# Patient Record
Sex: MALE | Race: OTHER
[De-identification: names, ages, dates, MRNs, and addresses within clinical notes are randomized per-mention and may not be internally consistent; named-entity substitution may affect disease eponyms.]

---

## 2017-05-27 NOTE — EDPHY
H & P


Time Seen by Provider: 05/27/17 15:12


HPI/ROS: 





CHIEF COMPLAINT:  Odontalgia





HISTORY OF PRESENT ILLNESS:  36-year-old male history of poor dentition 

complaining of bilateral maxillary dental pain ongoing for the past several 

days.  Atraumatic.  No fever no chills. No change in voice.  No facial 

swelling. No nuchal rigidity.











************


PHYSICAL EXAM





(Prior to examination, patient consented to physical exam, hands were washed 

and my usual and customary physical exam procedures followed)


1) GENERAL: Well-developed, well-nourished, alert and oriented.  Appears 

nontoxic.


2) HEAD: Normocephalic


3) HEENT: sclera anicteric.  No trismus no drooling .  No facial swelling.  

Nasolabial folds are symmetrical. Tender to percussion bilateral maxillary 

molars with no evidence of apical abscess.  No fluctuance.  Floor of mouth is 

soft.  No evidence of Fady's angina.  Submandibular and submental spaces are 

soft.


4) LUNGS: Breathing comfortably.   





Smoking Status: Current every day smoker


Constitutional: 





 Initial Vital Signs











Temperature (C)  36.7 C   05/27/17 15:05


 


Heart Rate  88   05/27/17 15:05


 


Respiratory Rate  16   05/27/17 15:05


 


Blood Pressure  114/83 H  05/27/17 15:05


 


O2 Sat (%)  9 L  05/27/17 15:05








 











O2 Delivery Mode               Room Air














Allergies/Adverse Reactions: 


 





No Known Allergies Allergy (Unverified 05/27/17 15:04)


 








Home Medications: 














 Medication  Instructions  Recorded


 


Amoxicillin Trihydrate 500 mg PO Q8 7 Days 05/27/17





[Amoxicillin 500mg cap]  


 


Hydrocodone/APAP 5/325 [Norco 1 tab PO Q6 PRN #7 tab 05/27/17





5/325 (RX)]  


 


Ibuprofen [Motrin (*)] 800 mg PO Q6 #15 tab 05/27/17














MDM/Departure





- MDM


ED Course/Re-evaluation: 





Doubt abscess deep space infection, phlegmon.  Doubt Fady's angina.  

Recommended follow-up with dentist.  Offered dental nerve block which he 

initially accepted however once the needle punctured his gingiva he immediately 

withdrew declined any further intervention.  Was unable to infiltrate any 

analgesia.  Plan will be discharged with antibiotic, analgesia and usual and 

customary dental precautions instructions.  He feels comfortable being 

discharged





- Depart


Disposition: Home, Routine, Self-Care


Clinical Impression: 


 Odontalgia





Condition: Good


Instructions:  Toothache (ED)


Additional Instructions: 


Return to the ER immediately if you cannot swallow, have drooling, fevers, neck 

stiffness, cannot open your jaw, or any other symptoms that concern you.


Prescriptions: 


Amoxicillin Trihydrate [Amoxicillin 500mg cap] 500 mg PO Q8 7 Days


Hydrocodone/APAP 5/325 [Norco 5/325 (RX)] 1 tab PO Q6 PRN #7 tab


 PRN Reason: Pain, Severe


Ibuprofen [Motrin (*)] 800 mg PO Q6 #15 tab


Referrals: 


[facility]ental [facility] [Outside] - As per Instructions


[facility]ental [facility] [Outside] - As per Instructions


[facility] [Outside] - As per Instructions


[facility] [Outside] - As per Instructions


[facility] [Outside] - As per Instructions

## 2018-02-04 ENCOUNTER — HOSPITAL ENCOUNTER (EMERGENCY)
Dept: HOSPITAL 80 - CED | Age: 38
Discharge: HOME | End: 2018-02-04
Payer: SELF-PAY

## 2018-02-04 VITALS
OXYGEN SATURATION: 95 % | RESPIRATION RATE: 19 BRPM | HEART RATE: 76 BPM | TEMPERATURE: 98.2 F | SYSTOLIC BLOOD PRESSURE: 138 MMHG | DIASTOLIC BLOOD PRESSURE: 68 MMHG
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
## 2018-02-04 DIAGNOSIS — S63.502A: Primary | ICD-10-CM

## 2018-02-04 DIAGNOSIS — F17.200: ICD-10-CM

## 2018-02-04 PROCEDURE — L3807 WHFO W/O JOINTS PRE CST: HCPCS

## 2018-02-04 NOTE — EDPHY
H & P


Time Seen by Provider: 02/04/18 16:31


HPI/ROS: 





37-year-old male presents complaining of fall approximately 4 days ago hope 

with current Azam pain in his left wrist


.  He also complains of a chipped tooth.


No other complaints.





Review of systems


General no fever no chills no weakness


HEENT no eye pain no eye discharge. No eye redness, no sore throat


Respiratory no cough, no shortness of breath


Cardiac no chest pain, no peripheral edema


GI no abdominal pain, no diarrhea, no constipation, no nausea, no vomiting


  no flank pain, no hematuria, no dysuria


Musculoskeletal positive myalgias/positive joint pain


Heme  no easy bruising, no easy bleeding


Endo no polyuria, no polydipsia


Skin no rashes, no pruritus


Neuro no syncope, no dizziness, no headaches


Psych is no suicidal ideation, no homicidal ideation





Past Medical/Surgical History: 





None


Social History: 





Uses tobacco daily


Occasional alcohol


Denies drug use


Smoking Status: Current every day smoker


Physical Exam: 





37-year-old male alert and oriented no acute distress nontoxic appearance, 

afebrile


Atraumatic normocephalic


Oropharynx no swelling no lip or tongue lacerations, left upper canine with 

small chip no dentin exposed


No trismus


Neck supple, nontender 


Lungs clear to auscultation bilaterally


Heart regular rate and rhythm without murmur or gallop


Abdomen NABS soft


Extremities no cyanosis clubbing or edema


Except


Left upper extremity with slight erythema and swelling over distal lateral rest

, full range of motion, good , good capillary refill intact


However tender to palpation at distal radius


Constitutional: 


 Initial Vital Signs











Temperature (C)  37 C   02/04/18 16:41


 


Heart Rate  95   02/04/18 16:41


 


Respiratory Rate  18   02/04/18 16:41


 


Blood Pressure  148/86 H  02/04/18 16:41


 


O2 Sat (%)  98   02/04/18 16:41








 











O2 Delivery Mode               Room Air














Allergies/Adverse Reactions: 


 





No Known Allergies Allergy (Verified 02/04/18 16:41)


 








Home Medications: 














 Medication  Instructions  Recorded


 


NK [No Known Home Meds]  10/11/17














Medical Decision Making





- Diagnostics


Imaging Results: 


 Imaging Impressions





Wrist X-Ray  02/04/18 16:54


Impression:  


1. No acute fracture.


2. Age indeterminate tear versus laxity of the scapholunate ligament.











ED Course/Re-evaluation: 





Patient seen and evaluated for wrist pain after a fall approximately 4 days ago





X-ray


Negative for fracture





Impression


Wrist sprain





Plan


Velcro thumb spica


Follow-up PCP


Giorgio Robbins


Differential Diagnosis: 





Wrist pain, wrist fracture, hand pain, hand fracture, sprain





- Data Points


Medications Given: 


 








Discontinued Medications





Ibuprofen (Motrin)  600 mg PO EDNOW ONE


   Stop: 02/04/18 16:56


   Last Admin: 02/04/18 17:05 Dose:  Not Given








Departure





- Departure


Disposition: Home, Routine, Self-Care


Clinical Impression: 


 Left wrist sprain





Condition: Good


Instructions:  Wrist Sprain (ED)


Additional Instructions: 


May take ibuprofen ervery 6-8 hours as need for pain.


Rest, ice, elevation, wrist splint.


Referrals: 


NONE *PRIMARY CARE P,. [Primary Care Provider] - As per Instructions


[facility],. [Clinic] - As per Instructions